# Patient Record
Sex: MALE | Race: WHITE | NOT HISPANIC OR LATINO | Employment: FULL TIME | URBAN - METROPOLITAN AREA
[De-identification: names, ages, dates, MRNs, and addresses within clinical notes are randomized per-mention and may not be internally consistent; named-entity substitution may affect disease eponyms.]

---

## 2019-09-05 ENCOUNTER — OFFICE VISIT (OUTPATIENT)
Dept: FAMILY MEDICINE | Facility: CLINIC | Age: 44
End: 2019-09-05
Payer: COMMERCIAL

## 2019-09-05 VITALS
RESPIRATION RATE: 16 BRPM | DIASTOLIC BLOOD PRESSURE: 74 MMHG | WEIGHT: 197.6 LBS | SYSTOLIC BLOOD PRESSURE: 132 MMHG | HEIGHT: 69 IN | OXYGEN SATURATION: 99 % | HEART RATE: 65 BPM | TEMPERATURE: 98.7 F | BODY MASS INDEX: 29.27 KG/M2

## 2019-09-05 DIAGNOSIS — J02.9 SORE THROAT: ICD-10-CM

## 2019-09-05 DIAGNOSIS — B34.9 VIRAL SYNDROME: Primary | ICD-10-CM

## 2019-09-05 LAB
DEPRECATED S PYO AG THROAT QL EIA: NORMAL
SPECIMEN SOURCE: NORMAL

## 2019-09-05 PROCEDURE — 87081 CULTURE SCREEN ONLY: CPT | Performed by: NURSE PRACTITIONER

## 2019-09-05 PROCEDURE — 99213 OFFICE O/P EST LOW 20 MIN: CPT | Performed by: NURSE PRACTITIONER

## 2019-09-05 PROCEDURE — 87880 STREP A ASSAY W/OPTIC: CPT | Performed by: NURSE PRACTITIONER

## 2019-09-05 ASSESSMENT — MIFFLIN-ST. JEOR: SCORE: 1778.75

## 2019-09-05 NOTE — PROGRESS NOTES
"Subjective     Haroon Sebastian is a 44 year old male who presents to clinic today for the following health issues:    HPI   ENT Symptoms             Symptoms: cc Present Absent Comment   Fever/Chills       Fatigue       Muscle Aches       Eye Irritation       Sneezing       Nasal Samuel/Drg       Sinus Pressure/Pain       Loss of smell       Dental pain       Sore Throat  x     Swollen Glands       Ear Pain/Fullness       Cough       Wheeze       Chest Pain       Shortness of breath       Rash       Other         Symptom duration:  2 days    Symptom severity:  same    Treatments tried:  Ibuprofen    Contacts:  none            Patient Active Problem List   Diagnosis     CARDIOVASCULAR SCREENING; LDL GOAL LESS THAN 160     Family history of diabetes mellitus     Past Surgical History:   Procedure Laterality Date     carpal tunnel right  2005       Social History     Tobacco Use     Smoking status: Former Smoker     Types: Cigarettes     Smokeless tobacco: Never Used   Substance Use Topics     Alcohol use: No     Family History   Problem Relation Age of Onset     Diabetes Father      Hypertension Father      Heart Disease Maternal Grandfather      Gastrointestinal Disease Brother          Current Outpatient Medications   Medication Sig Dispense Refill     amoxicillin-clavulanate (AUGMENTIN) 875-125 MG per tablet Take 1 tablet by mouth 2 times daily (Patient not taking: Reported on 9/5/2019) 20 tablet 0     No Known Allergies      Reviewed and updated as needed this visit by Provider  Tobacco  Allergies  Meds  Problems  Med Hx  Surg Hx  Fam Hx         Review of Systems   ROS COMP: Constitutional, HEENT, cardiovascular, pulmonary, GI, , musculoskeletal, neuro, skin, endocrine and psych systems are negative, except as otherwise noted.      Objective    /74 (BP Location: Right arm, Patient Position: Chair, Cuff Size: Adult Large)   Pulse 65   Temp 98.7  F (37.1  C) (Tympanic)   Resp 16   Ht 1.74 m (5' 8.5\") " "  Wt 89.6 kg (197 lb 9.6 oz)   SpO2 99%   BMI 29.61 kg/m    Body mass index is 29.61 kg/m .  Physical Exam   GENERAL: healthy, alert and no distress, nontoxic in appearance  EYES: Eyes grossly normal to inspection, PERRL and conjunctivae and sclerae normal  HENT: ear canals and TM's normal, nose and mouth without ulcers or lesions  NECK: no adenopathy, supple with full ROM  RESP: lungs clear to auscultation - no rales, rhonchi or wheezes  CV: regular rate and rhythm, normal S1 S2, no S3 or S4, no murmur, click or rub, no peripheral edema   ABDOMEN: soft, nontender, no hepatosplenomegaly, no masses and bowel sounds normal  MS: no gross musculoskeletal defects noted, no edema  No rash    Diagnostic Test Results:  Labs reviewed in Epic  Results for orders placed or performed in visit on 09/05/19 (from the past 24 hour(s))   Strep, Rapid Screen   Result Value Ref Range    Specimen Description Throat     Rapid Strep A Screen       NEGATIVE: No Group A streptococcal antigen detected by immunoassay, await culture report.           Assessment & Plan   Problem List Items Addressed This Visit     None      Visit Diagnoses     Viral syndrome    -  Primary    Sore throat        Relevant Orders    Strep, Rapid Screen (Completed)    Beta strep group A culture (Completed)             BMI:   Estimated body mass index is 29.61 kg/m  as calculated from the following:    Height as of this encounter: 1.74 m (5' 8.5\").    Weight as of this encounter: 89.6 kg (197 lb 9.6 oz).   Weight management plan: Patient was referred to their PCP to discuss a diet and exercise plan.        Patient Instructions   Increase rest and fluids. Tylenol and/or Ibuprofen for comfort. Cool mist vaporizer. If your symptoms worsen or do not resolve follow up with your primary care provider in 1 week and sooner if needed.      Strep culture is pending will result in 24-48 hours.  If it is positive and change in treatment plan will contact you.  " "    Indications for emergent return to emergency department discussed with patient, who verbalized good understanding and agreement.  Patient understands the limitations of today's evaluation.           Patient Education     Viral Syndrome (Adult)  A viral illness may cause a number of symptoms such as fever. Other symptoms depend on the part of the body that the virus affects. If it settles in your nose, throat, and lungs, it may cause cough, sore throat, congestion, runny nose, headache, earache and other ear symptoms, or shortness of breath. If it settles in your stomach and intestinal tract, it may cause nausea, vomiting, cramping, and diarrhea. Sometimes it causes generalized symptoms like \"aching all over,\" feeling tired, loss of energy, or loss of appetite.  A viral illness usually lasts anywhere from several days to several weeks, but sometimes it lasts longer. In some cases, a more serious infection can look like a viral syndrome in the first few days of the illness. You may need another exam and additional tests to know the difference. Watch for the warning signs listed below for when to seek medical advice.  Home care  Follow these guidelines for taking care of yourself at home:    If symptoms are severe, rest at home for the first 2 to 3 days.    Stay away from cigarette smoke - both your smoke and the smoke from others.    You may use over-the-counter acetaminophen or ibuprofen for fever, muscle aching, and headache, unless another medicine was prescribed for this. If you have chronic liver or kidney disease or ever had a stomach ulcer or gastrointestinal bleeding, talk with your healthcare provider before using these medicines. No one who is younger than 18 and ill with a fever should take aspirin. It may cause severe disease or death.    Your appetite may be poor, so a light diet is fine. Avoid dehydration by drinking 8 to 12, 8-ounce glasses of fluids each day. This may include water; orange juice; " lemonade; apple, grape, and cranberry juice; clear fruit drinks; electrolyte replacement and sports drinks; and decaffeinated teas and coffee. If you have been diagnosed with a kidney disease, ask your healthcare provider how much and what types of fluids you should drink to prevent dehydration. If you have kidney disease, drinking too much fluid can cause it build up in the your body and be dangerous to your health.    Over-the-counter remedies won't shorten the length of the illness but may be helpful for symptoms such as cough, sore throat, nasal and sinus congestion, or diarrhea. Don't use decongestants if you have high blood pressure.  Follow-up care  Follow up with your healthcare provider if you do not improve over the next week.  Call 911  Call 911 if any of the following occur:    Convulsion    Feeling weak, dizzy, or like you are going to faint    Chest pain, or more than mild shortness of breath  When to seek medical advice  Call your healthcare provider right away if any of these occur:    Cough with lots of colored sputum (mucus) or blood in your sputum    Chest pain, shortness of breath, wheezing, or trouble breathing    Severe headache; face, neck, or ear pain    Severe, constant pain in the lower right side of your belly (abdominal)    Continued vomiting (can t keep liquids down)    Frequent diarrhea (more than 5 times a day); blood (red or black color) or mucus in diarrhea    Feeling weak, dizzy, or like you are going to faint    Extreme thirst    Fever of 100.4 F (38 C) or higher, or as directed by your healthcare provider  Date Last Reviewed: 4/1/2018 2000-2018 [employer]. [address] [place] [address], [place] [address]. All rights reserved. This information is not intended as a substitute for professional medical care. Always follow your healthcare professional's instructions.             Return in about 1 week (around 9/12/2019), or if symptoms worsen or fail to improve, for Follow  up with your primary care provider.    CINTHYA Perez CNP  [facility]

## 2019-09-05 NOTE — LETTER
September 6, 2019      Haroon Sebastian  [address] [facility] [id number]        Dear Haroon,         This letter is to inform you that the results of your recent throat culture are negative.  If you have any questions please call or make an appointment.          Sincerely,        CINTHYA Perez CNP/ [facility]al

## 2019-09-05 NOTE — PATIENT INSTRUCTIONS
"Increase rest and fluids. Tylenol and/or Ibuprofen for comfort. Cool mist vaporizer. If your symptoms worsen or do not resolve follow up with your primary care provider in 1 week and sooner if needed.      Strep culture is pending will result in 24-48 hours.  If it is positive and change in treatment plan will contact you.      Indications for emergent return to emergency department discussed with patient, who verbalized good understanding and agreement.  Patient understands the limitations of today's evaluation.           Patient Education     Viral Syndrome (Adult)  A viral illness may cause a number of symptoms such as fever. Other symptoms depend on the part of the body that the virus affects. If it settles in your nose, throat, and lungs, it may cause cough, sore throat, congestion, runny nose, headache, earache and other ear symptoms, or shortness of breath. If it settles in your stomach and intestinal tract, it may cause nausea, vomiting, cramping, and diarrhea. Sometimes it causes generalized symptoms like \"aching all over,\" feeling tired, loss of energy, or loss of appetite.  A viral illness usually lasts anywhere from several days to several weeks, but sometimes it lasts longer. In some cases, a more serious infection can look like a viral syndrome in the first few days of the illness. You may need another exam and additional tests to know the difference. Watch for the warning signs listed below for when to seek medical advice.  Home care  Follow these guidelines for taking care of yourself at home:    If symptoms are severe, rest at home for the first 2 to 3 days.    Stay away from cigarette smoke - both your smoke and the smoke from others.    You may use over-the-counter acetaminophen or ibuprofen for fever, muscle aching, and headache, unless another medicine was prescribed for this. If you have chronic liver or kidney disease or ever had a stomach ulcer or gastrointestinal bleeding, talk with your " healthcare provider before using these medicines. No one who is younger than 18 and ill with a fever should take aspirin. It may cause severe disease or death.    Your appetite may be poor, so a light diet is fine. Avoid dehydration by drinking 8 to 12, 8-ounce glasses of fluids each day. This may include water; orange juice; lemonade; apple, grape, and cranberry juice; clear fruit drinks; electrolyte replacement and sports drinks; and decaffeinated teas and coffee. If you have been diagnosed with a kidney disease, ask your healthcare provider how much and what types of fluids you should drink to prevent dehydration. If you have kidney disease, drinking too much fluid can cause it build up in the your body and be dangerous to your health.    Over-the-counter remedies won't shorten the length of the illness but may be helpful for symptoms such as cough, sore throat, nasal and sinus congestion, or diarrhea. Don't use decongestants if you have high blood pressure.  Follow-up care  Follow up with your healthcare provider if you do not improve over the next week.  Call 911  Call 911 if any of the following occur:    Convulsion    Feeling weak, dizzy, or like you are going to faint    Chest pain, or more than mild shortness of breath  When to seek medical advice  Call your healthcare provider right away if any of these occur:    Cough with lots of colored sputum (mucus) or blood in your sputum    Chest pain, shortness of breath, wheezing, or trouble breathing    Severe headache; face, neck, or ear pain    Severe, constant pain in the lower right side of your belly (abdominal)    Continued vomiting (can t keep liquids down)    Frequent diarrhea (more than 5 times a day); blood (red or black color) or mucus in diarrhea    Feeling weak, dizzy, or like you are going to faint    Extreme thirst    Fever of 100.4 F (38 C) or higher, or as directed by your healthcare provider  Date Last Reviewed: 4/1/2018 2000-2018 [employer]. [address] [place] [address], [place] [address]. All rights reserved. This information is not intended as a substitute for professional medical care. Always follow your healthcare professional's instructions.

## 2019-09-05 NOTE — NURSING NOTE
"Chief Complaint   Patient presents with     Pharyngitis       Initial /74 (BP Location: Right arm, Patient Position: Chair, Cuff Size: Adult Large)   Pulse 65   Temp 98.7  F (37.1  C) (Tympanic)   Resp 16   Ht 1.74 m (5' 8.5\")   Wt 89.6 kg (197 lb 9.6 oz)   SpO2 99%   BMI 29.61 kg/m   Estimated body mass index is 29.61 kg/m  as calculated from the following:    Height as of this encounter: 1.74 m (5' 8.5\").    Weight as of this encounter: 89.6 kg (197 lb 9.6 oz).    Patient presents to the clinic using No DME    Health Maintenance that is potentially due pending provider review:  Flu shot    Pt will schedule future appt.    Is there anyone who you would like to be able to receive your results? No  If yes have patient fill out SANDHYA  Nasir Galvan M.A.        "

## 2019-09-06 LAB
BACTERIA SPEC CULT: NORMAL
SPECIMEN SOURCE: NORMAL

## 2022-05-01 ENCOUNTER — HEALTH MAINTENANCE LETTER (OUTPATIENT)
Age: 47
End: 2022-05-01

## 2022-06-06 ENCOUNTER — OFFICE VISIT (OUTPATIENT)
Dept: FAMILY MEDICINE | Facility: CLINIC | Age: 47
End: 2022-06-06
Payer: COMMERCIAL

## 2022-06-06 VITALS
RESPIRATION RATE: 16 BRPM | SYSTOLIC BLOOD PRESSURE: 120 MMHG | DIASTOLIC BLOOD PRESSURE: 82 MMHG | BODY MASS INDEX: 33.04 KG/M2 | HEIGHT: 68 IN | TEMPERATURE: 99.1 F | HEART RATE: 70 BPM | WEIGHT: 218 LBS

## 2022-06-06 DIAGNOSIS — Z13.1 SCREENING FOR DIABETES MELLITUS: ICD-10-CM

## 2022-06-06 DIAGNOSIS — M54.50 RIGHT-SIDED LOW BACK PAIN WITHOUT SCIATICA, UNSPECIFIED CHRONICITY: Primary | ICD-10-CM

## 2022-06-06 DIAGNOSIS — Z13.220 SCREENING FOR HYPERLIPIDEMIA: ICD-10-CM

## 2022-06-06 DIAGNOSIS — Z11.59 NEED FOR HEPATITIS C SCREENING TEST: ICD-10-CM

## 2022-06-06 LAB
CHOLEST SERPL-MCNC: 196 MG/DL
FASTING STATUS PATIENT QL REPORTED: NO
FASTING STATUS PATIENT QL REPORTED: NO
GLUCOSE BLD-MCNC: 92 MG/DL (ref 70–99)
HDLC SERPL-MCNC: 41 MG/DL
LDLC SERPL CALC-MCNC: 113 MG/DL
NONHDLC SERPL-MCNC: 155 MG/DL
TRIGL SERPL-MCNC: 209 MG/DL

## 2022-06-06 PROCEDURE — 82947 ASSAY GLUCOSE BLOOD QUANT: CPT | Performed by: FAMILY MEDICINE

## 2022-06-06 PROCEDURE — 86803 HEPATITIS C AB TEST: CPT | Performed by: FAMILY MEDICINE

## 2022-06-06 PROCEDURE — 80061 LIPID PANEL: CPT | Performed by: FAMILY MEDICINE

## 2022-06-06 PROCEDURE — 99213 OFFICE O/P EST LOW 20 MIN: CPT | Performed by: FAMILY MEDICINE

## 2022-06-06 PROCEDURE — 36415 COLL VENOUS BLD VENIPUNCTURE: CPT | Performed by: FAMILY MEDICINE

## 2022-06-06 ASSESSMENT — ANXIETY QUESTIONNAIRES
6. BECOMING EASILY ANNOYED OR IRRITABLE: SEVERAL DAYS
7. FEELING AFRAID AS IF SOMETHING AWFUL MIGHT HAPPEN: NOT AT ALL
7. FEELING AFRAID AS IF SOMETHING AWFUL MIGHT HAPPEN: NOT AT ALL
GAD7 TOTAL SCORE: 6
3. WORRYING TOO MUCH ABOUT DIFFERENT THINGS: SEVERAL DAYS
8. IF YOU CHECKED OFF ANY PROBLEMS, HOW DIFFICULT HAVE THESE MADE IT FOR YOU TO DO YOUR WORK, TAKE CARE OF THINGS AT HOME, OR GET ALONG WITH OTHER PEOPLE?: SOMEWHAT DIFFICULT
GAD7 TOTAL SCORE: 6
4. TROUBLE RELAXING: SEVERAL DAYS
5. BEING SO RESTLESS THAT IT IS HARD TO SIT STILL: SEVERAL DAYS
1. FEELING NERVOUS, ANXIOUS, OR ON EDGE: SEVERAL DAYS
2. NOT BEING ABLE TO STOP OR CONTROL WORRYING: SEVERAL DAYS
GAD7 TOTAL SCORE: 6

## 2022-06-06 ASSESSMENT — PATIENT HEALTH QUESTIONNAIRE - PHQ9
SUM OF ALL RESPONSES TO PHQ QUESTIONS 1-9: 5
10. IF YOU CHECKED OFF ANY PROBLEMS, HOW DIFFICULT HAVE THESE PROBLEMS MADE IT FOR YOU TO DO YOUR WORK, TAKE CARE OF THINGS AT HOME, OR GET ALONG WITH OTHER PEOPLE: NOT DIFFICULT AT ALL
SUM OF ALL RESPONSES TO PHQ QUESTIONS 1-9: 5

## 2022-06-06 ASSESSMENT — PAIN SCALES - GENERAL: PAINLEVEL: NO PAIN (1)

## 2022-06-06 NOTE — PATIENT INSTRUCTIONS
ASSESSMENT:   (M54.50) Right-sided low back pain without sciatica, unspecified chronicity  (primary encounter diagnosis)  Comment: recurring low back pain. This appears muscular-ligamentous-strain/pulled muscles.  Nerves OK.   Plan: XR Lumbar Spine 2/3 Views          OK for the ibuprofen as needed.   Ibuprofen 200mg OTC may be taken up to 4 pills 4 times a day to the maximum of 3200mg or 16 pills daily as needed for pain.   Take with food.  Don't take with aspirin, Aleve or other antiinflammatory medication or with warfarin.     You can take acetaminophen with the ibuprofen if needed.   Sometimes the two together work better than each one.   We sometimes try a relative of ibuprofen or a muscle relaxer.     Weight loss can help in the longer term     I suggest physical therapy for the back pain and strengthening to prevent more pain.     Acetaminophen (Tylenol) may be taken as needed for pain and fever.  The maximum doses are listed below, around 3000mg a day.  Regular strength pills are 325mg.  The maximum daily dose is two pills (650mg) every 4 to 6 hours up to 3250mg a day.   Extra strength pills are 500mg each.  The maximum dose is two pills (1000mg) every 6 hours as needed up to 3000mg a day.   Extended release pills are 650mg each.  The maximum dose is two pills (1300mg) every 8 hours as needed up to 3900mg a day.     Watch out for acetaminophen in other over the counter or prescription medications.      Too much acetaminophen can lead to serious liver damage.     Schedule an appointment with physical therapy for help with the back pain and prevention.     (Z11.59) Need for hepatitis C screening test    (Z13.220) Screening for hyperlipidemia

## 2022-06-06 NOTE — NURSING NOTE
"Chief Complaint   Patient presents with     Pain       Initial /82   Pulse 70   Temp 99.1  F (37.3  C) (Tympanic)   Resp 16   Ht 1.734 m (5' 8.25\")   Wt 98.9 kg (218 lb)   BMI 32.90 kg/m   Estimated body mass index is 32.9 kg/m  as calculated from the following:    Height as of this encounter: 1.734 m (5' 8.25\").    Weight as of this encounter: 98.9 kg (218 lb).    Patient presents to the clinic using     Health Maintenance that is potentially due pending provider review:          Is there anyone who you would like to be able to receive your results?   If yes have patient fill out SANDHYA    "

## 2022-06-06 NOTE — PROGRESS NOTES
ASSESSMENT:   (M54.50) Right-sided low back pain without sciatica, unspecified chronicity  (primary encounter diagnosis)  Comment: recurring low back pain. This appears muscular-ligamentous-strain/pulled muscles.  Nerves OK.   Plan: XR Lumbar Spine 2/3 Views          OK for the ibuprofen as needed.   Ibuprofen 200mg OTC may be taken up to 4 pills 4 times a day to the maximum of 3200mg or 16 pills daily as needed for pain.   Take with food.  Don't take with aspirin, Aleve or other antiinflammatory medication or with warfarin.     You can take acetaminophen with the ibuprofen if needed.   Sometimes the two together work better than each one.   We sometimes try a relative of ibuprofen or a muscle relaxer.     Weight loss can help in the longer term     I suggest physical therapy for the back pain and strengthening to prevent more pain.     Acetaminophen (Tylenol) may be taken as needed for pain and fever.  The maximum doses are listed below, around 3000mg a day.  Regular strength pills are 325mg.  The maximum daily dose is two pills (650mg) every 4 to 6 hours up to 3250mg a day.   Extra strength pills are 500mg each.  The maximum dose is two pills (1000mg) every 6 hours as needed up to 3000mg a day.   Extended release pills are 650mg each.  The maximum dose is two pills (1300mg) every 8 hours as needed up to 3900mg a day.     Watch out for acetaminophen in other over the counter or prescription medications.      Too much acetaminophen can lead to serious liver damage.     Schedule an appointment with physical therapy for help with the back pain and prevention.     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    Throws or pulls back out.  Frequency: every 6-8 weeks more recently.   Was occurring a couple times a year until the past 1-2 years.  Can feel it coming on.   He has seen doctor at work and has been seen three times for this.   Duration: 3 days then fades over the next 3-4 days.   Alleviating factors: ibuprofen 600-800mg up to three times daily.   No other meds.   He tried cyclobenzaprine in the past which did not help.      Location: lower right back.   Can radiate to right hip when bad.  Never goes to buttock or leg.   No neuro symptoms in legs.     Occupation: drywall taping.     Pain History:  When did you first notice your pain? - Chronic Pain   Have you seen this provider for your pain in the past?   No   Where in your body do your have pain? Low back  Are you seeing anyone else for your pain? No  What makes your pain better?   What makes your pain worse? Squatting   How has pain affected your ability to work? Slows him down, can still work   Worse pain: 8/10      PHQ-9 SCORE 6/6/2022   PHQ-9 Total Score MyChart 5 (Mild depression)   PHQ-9 Total Score 5       JEREMY-7 SCORE 6/6/2022   Total Score 6 (mild anxiety)   Total Score 6       Chronic Pain - Initial Assessment:    How would you describe your pain? Cramping dull ache sharp  Have you had any recent changes to the severity or character of your pain? Gradually worsening   Is there an underlying cause that has been identified? ??? Injury back when he was 20.  Has your ability to work or do daily activities changed recently because of your pain?slows him down  Which of these pain treatments have you tried? Ibuprofen 600-800 mg up to tid  Previous medication treatments:        Patient Active Problem List   Diagnosis     CARDIOVASCULAR SCREENING; LDL GOAL LESS THAN 160     Family history of diabetes mellitus      ROS:General: POSITIVE for:, weight gain 20# in the past couple years.   Wt Readings from Last 5 Encounters:   06/06/22 98.9 kg (218 lb)   09/05/19 89.6 kg (197 lb 9.6 oz)   10/01/13 89.8 kg (198 lb)   10/01/13 89.8 kg (198  "lb)   07/13/11 92.9 kg (204 lb 12.8 oz)      GI: No nausea, vomiting,  heartburn, abdominal pain, diarrhea, constipation or change in bowel habits  : No urinary frequency or dysuria, bladder or kidney problems  Musculoskeletal: POSITIVE  for:, pain left hip and left knee chronic pain.  Shoulder injury in the past.     OBJECTIVE:Blood pressure 120/82, pulse 70, temperature 99.1  F (37.3  C), temperature source Tympanic, resp. rate 16, height 1.734 m (5' 8.25\"), weight 98.9 kg (218 lb). BMI=Body mass index is 32.9 kg/m .  GENERAL APPEARANCE ADULT: Alert, no acute distress, obese  MS: extremities normal, no peripheral edema  back exam: normal posture, shoulders, inferior scapular borders and hips even and symmetrical, moves about the exam room comfortably, ROM good flexion, some pain with extension.  Some pain with lateral flexion to left side.  No pain with rotation to either side.  tenderness to palpitation Right SI area, straight leg raise right normal, straight leg raise left normal, reflexes at knees normal, reflexes at ankles normal, heel and toe walk normal       "

## 2022-06-08 LAB — HCV AB SERPL QL IA: NONREACTIVE

## 2022-06-08 NOTE — RESULT ENCOUNTER NOTE
"Hi Haroon,   Triglycerides, a type of fat found in the bloodstream is high.  The HDL \"good cholesterol\" is at a normal level.  LDL (\"bad cholesterol\") is high.  This increases risk for cardiovascular disease (heart attacks and strokes).   PLAN: Your overall cardiovascular risk is low at this time.  I don't think you need medication for cholesterol but Weight loss, regular exercise with goal of 30 minutes most days of the week and eating a prudent diet (lots of fruits and vegetables, limiting unhealthy fats and excessive calories) can help improve triglycerides and LDL cholesterol.  DIANA HOUGH MD    "

## 2022-06-30 ENCOUNTER — HOSPITAL ENCOUNTER (OUTPATIENT)
Dept: PHYSICAL THERAPY | Facility: CLINIC | Age: 47
Setting detail: THERAPIES SERIES
Discharge: HOME | End: 2022-06-30
Attending: FAMILY MEDICINE
Payer: COMMERCIAL

## 2022-06-30 DIAGNOSIS — M54.50 RIGHT-SIDED LOW BACK PAIN WITHOUT SCIATICA, UNSPECIFIED CHRONICITY: ICD-10-CM

## 2022-06-30 PROCEDURE — 97161 PT EVAL LOW COMPLEX 20 MIN: CPT | Mod: GP | Performed by: PHYSICAL THERAPIST

## 2022-06-30 PROCEDURE — 97110 THERAPEUTIC EXERCISES: CPT | Mod: GP | Performed by: PHYSICAL THERAPIST

## 2022-06-30 NOTE — PROGRESS NOTES
"   06/30/22 1400   General Information   Type of Visit Initial OP Ortho PT Evaluation   Start of Care Date 06/30/22   Referring Physician Ford Martinez   Patient/Family Goals Statement decreased pain, keep snow boarding,   Orders Evaluate and Treat   Date of Order 06/06/22   Medical Diagnosis Right-sided low back pain without sciatica, unspecified chronicity   Surgical/Medical history reviewed Yes   Precautions/Limitations no known precautions/limitations   General Information Comments PMH: depression   Body Part(s)   Body Part(s) Lumbar Spine/SI   Presentation and Etiology   Pertinent history of current problem (include personal factors and/or comorbidities that impact the POC) 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    Throws or pulls back out.  Frequency: every 6-8 weeks more recently.  Was occurring a couple times a year until the past 1-2 years.  Can feel it coming on.   He has seen doctor at work and has been seen three times for this.   Back pain is there most days, more on the right than left.  Notices that it increases with certain positions such as looking down and to the right. Gradually has gone \"out\" about every other month. No symptoms down the leg when it does go out. No buckling, no numbness or tingling. IB profen helps when the back goes out. Rest seems to help the most. Has tried TENS unit as well and that seems to help.   Impairments A. Pain;E. Decreased flexibility   Functional Limitations perform activities of daily living;perform required work activities;perform desired leisure / sports activities   Symptom Location low back on both sides   How/Where did it occur With repetition/overuse;From insidious onset   Onset date of current episode/exacerbation 06/06/22  (chronic)   Chronicity Recurrent   Pain rating (0-10 point scale) Best (/10);Worst (/10)   Best (/10) 2   Worst (/10) 9   Pain quality A. Sharp;B. Dull;C. Aching;F. Stabbing   Frequency of pain/symptoms C. With " activity   Pain/symptoms exacerbated by G. Certain positions;I. Bending   Pain/symptoms eased by C. Rest;I. OTC medication(s)   Progression of symptoms since onset: Worsened   Current / Previous Interventions   Diagnostic Tests: X-ray   X-ray Results Results   X-ray results There are 5 lumbar type vertebral bodies. Small riblets are seen at the L1 level. There is minimal broad-based dextroconvex curvature of the lumbar spine. The alignment of the lumbar spine is otherwise normal. The vertebral body heights are   maintained. There is minimal multilevel intervertebral disc space narrowing and endplate degenerative change, most pronounced at L5-S1. The visualized abdominal soft tissues are unremarkable.   Current Level of Function   Patient role/employment history A. Employed   Employment Comments dry    Fall Risk Screen   Fall screen completed by PT   Have you fallen 2 or more times in the past year? No   Have you fallen and had an injury in the past year? No   Is patient a fall risk? No   Abuse Screen (yes response referral indicated)   Feels Unsafe at Home or Work/School no   Feels Threatened by Someone no   Does Anyone Try to Keep You From Having Contact with Others or Doing Things Outside Your Home? no   Physical Signs of Abuse Present no   System Outcome Measures   Outcome Measures Low Back Pain (see Oswestry and Dary)   Lumbar Spine/SI Objective Findings   Hamstring Flexibility 90/90:  45 from 0 B   Hip Flexor Flexibility mod tightness B   Piriformis Flexibility mod tightness B   Flexion ROM finger tips 8 inches from toes (no increase in pain)   Extension ROM WNL, mild tight feeling at end range   Right Side Bending ROM finger tips 1 inch above knee joint line (Compression feeling)   Left Side Bending ROM finger tip 2 inches from joint line (some increase in pain)   Repeated Extension-Standing ROM no change in pain   Repeated Flexion-Standing ROM no change in pain   Lumbar ROM Comment rotation WNL no  pain at end range   Pelvic Screen SI compression, approximation and distraction - B   Hip Screen hip flexion to 100 R and 110 L   Hip Flexion (L2) Strength 5/5   Hip Abduction Strength 4+/5 B   Knee Flexion Strength 5/5 b   Knee Extension (L3) Strength 5/5 B   Ford Test + for back pain and hip tightness on R, - on L for back pain but + for tightness   Prone Instability Test -   Spring Test L2-3   Lumbar/SI Special Tests Comments passive lumbar extension - for pain   Segmental Mobility L2-3 hypermobile with some pain   Palpation increased tone and mild tenderness through right lumbar paraspinals, right QL and right glut med   Observation left ASIS higher   Sensation Testing WNL B   Planned Therapy Interventions   Planned Therapy Interventions balance training;gait training;joint mobilization;manual therapy;neuromuscular re-education;ROM;strengthening;stretching;transfer training   Clinical Impression   Criteria for Skilled Therapeutic Interventions Met yes, treatment indicated   PT Diagnosis decreased lumbar core strength and muscle tightness   Influenced by the following impairments decreased lumbar core strength and muscle tightness, joint hypermobility   Functional limitations due to impairments bending, reaching, random movements   Clinical Presentation Stable/Uncomplicated   Clinical Presentation Rationale clinical decision making and chart review   Clinical Decision Making (Complexity) Low complexity   Therapy Frequency 1 time/week   Predicted Duration of Therapy Intervention (days/wks) 8 weeks   Risk & Benefits of therapy have been explained Yes   Patient, Family & other staff in agreement with plan of care Yes   Clinical Impression Comments Haroon Sebastian is a 47 year old male who presents to PT with complaints of recurrent back pain flair ups. Has general back pain at baseline most days but flair ups with random movements about once per month.   Education Assessment   Preferred Learning Style  Listening;Demonstration;Pictures/video   Barriers to Learning No barriers   ORTHO GOALS   PT Ortho Eval Goals 1;2;3;4   Ortho Goal 1   Goal Identifier 1   Goal Description Patient will improve core strength to be able to complete a plank for 30 seconds from knees in order to improve LB stabilization and pain levels.   Target Date 07/28/22   Ortho Goal 2   Goal Identifier 2   Goal Description Patient will improve hip flexor flexibility so parvin test doesn't produce pain on right side of low back in order to improve pelvic position and decrease back pain.   Target Date 07/28/22   Ortho Goal 3   Goal Identifier 3   Goal Description Patient will be able to complete all work related tasks with back pain staying below a 2/10 pain during the day.   Target Date 08/25/22   Ortho Goal 4   Goal Identifier 4   Goal Description Patient will improve B HS flexiblity to be able to reach within 3 inches of his toes in order to decrease tightness and pulling on low back during work and recreational activities.   Target Date 08/25/22   Total Evaluation Time   PT Eval, Low Complexity Minutes (34795) 17       Vanessa Brown  PT, DPT       6/30/2022   [address]   [address] [place] [id number]  [email or web address]  Voicemail: [phone]

## 2022-08-25 NOTE — PROGRESS NOTES
Physical Therapy Discharge Summary    Haroon Sebastian has completed the ordered physical therapy evaluation. Treatment recommendations were made, but pt has not returned for any further recommended PT sessions.  Pt was unable to be re-assessed, and present status is unknown.    Please contact me with any questions or concerns.  Thank you for your referral.    Vanessa Brown  PT, DPT       8/25/2022   [address] [address]  [email or web address]  Voicemail: [phone]

## 2022-11-20 ENCOUNTER — HEALTH MAINTENANCE LETTER (OUTPATIENT)
Age: 47
End: 2022-11-20

## 2023-06-02 ENCOUNTER — HEALTH MAINTENANCE LETTER (OUTPATIENT)
Age: 48
End: 2023-06-02

## 2023-09-14 ENCOUNTER — OFFICE VISIT (OUTPATIENT)
Dept: FAMILY MEDICINE | Facility: CLINIC | Age: 48
End: 2023-09-14
Payer: COMMERCIAL

## 2023-09-14 VITALS
HEART RATE: 70 BPM | WEIGHT: 199 LBS | RESPIRATION RATE: 16 BRPM | TEMPERATURE: 98.7 F | OXYGEN SATURATION: 98 % | SYSTOLIC BLOOD PRESSURE: 120 MMHG | DIASTOLIC BLOOD PRESSURE: 82 MMHG | BODY MASS INDEX: 30.04 KG/M2

## 2023-09-14 DIAGNOSIS — Z11.3 SCREEN FOR STD (SEXUALLY TRANSMITTED DISEASE): ICD-10-CM

## 2023-09-14 DIAGNOSIS — R39.89 GENITAL SORE: Primary | ICD-10-CM

## 2023-09-14 DIAGNOSIS — B00.9 HERPES SIMPLEX VIRUS INFECTION: ICD-10-CM

## 2023-09-14 PROCEDURE — 99213 OFFICE O/P EST LOW 20 MIN: CPT | Performed by: FAMILY MEDICINE

## 2023-09-14 PROCEDURE — 87491 CHLMYD TRACH DNA AMP PROBE: CPT | Performed by: FAMILY MEDICINE

## 2023-09-14 PROCEDURE — 87529 HSV DNA AMP PROBE: CPT | Performed by: FAMILY MEDICINE

## 2023-09-14 PROCEDURE — 87591 N.GONORRHOEAE DNA AMP PROB: CPT | Performed by: FAMILY MEDICINE

## 2023-09-14 NOTE — PROGRESS NOTES
Assessment & Plan     Genital sore  - valACYclovir (VALTREX) 1000 mg tablet; Take 1 tablet (1,000 mg) by mouth 2 times daily for 10 days  Screen for STD (sexually transmitted disease)  - Chlamydia & Gonorrhea by PCR, [facility] - Clinic Collect  - Viral Culture Non-respiratory; Future  - HSV Types 1 and 2 Qualitative PCR CSF; Future  - HSV Types 1 and 2 Qualitative PCR CSF  Herpes simplex virus infection  - valACYclovir (VALTREX) 1000 mg tablet; Take 1 tablet (1,000 mg) by mouth 2 times daily for 10 days  EHR reviewed.   Past medical history, problem list, past surgical history, family history, social history, medications reviewed, updated, reconciled.   We discussed options of STD screening. He agrees to screen for chlamydia, gonorrhea. He declines blood work for HIV, RPR, hepatitis, encouraged to consider these in the future.   Lesion swabbed for suspected herpes virus. Positive for HSV 2. I spoke to Haroon. Will complete course of antiviral medication. We discussed natural course, risk of recurrence, notification of  partner. All questions and concerns addressed.   Encouraged to keep up with routine health maintenance.       Aman Brush MD  [facility]    Ramandeep Patiño is a 48 year old, presenting for the following health issues:  Other (Blisters and sores in genital area, mild pain, noticed about a week ago )      9/14/2023    12:38 PM   Additional Questions   Roomed by Grecia       History of Present Illness       Reason for visit:  Possible std  Symptom onset:  3-7 days ago  Symptoms include:  Blisters  Symptom intensity:  Mild  Symptom progression:  Staying the same  Had these symptoms before:  No    He eats 0-1 servings of fruits and vegetables daily.He consumes 2 sweetened beverage(s) daily.He exercises with enough effort to increase his heart rate 9 or less minutes per day.  He exercises with enough effort to increase his heart rate 3 or less days per week.   He is taking  medications regularly.     [age] year old male here with concerns of painful blisters, located on the top of the penis. He notes they are not that painful anymore, maybe a new lesion popped up today though. The others seemed to be drying up. Is worried and wants to be checked for sexually transmitted disease. He notes he was with his wife for twenty years until recent divorce. He has a new partner, about five months ago, he is not aware of any current exposure to infection. He has no dysuria. No other skin lesions though his skin is often rough, has small spots in his legs, wondering if this the same thing.             Objective    /82 (BP Location: Left arm, Patient Position: Sitting, Cuff Size: Adult Regular)   Pulse 70   Temp 98.7  F (37.1  C) (Oral)   Resp 16   Wt 90.3 kg (199 lb)   SpO2 98%   BMI 30.04 kg/m    Body mass index is 30.04 kg/m .  Physical Exam   GENERAL: healthy, alert and no distress  EYES: Eyes grossly normal to inspection, PERRL and conjunctivae and sclerae normal   (male): testicles normal without atrophy or masses and small and pinpoint lesions over superior and lateral shaft of penis, some dry and scabbing, one lesion on the right is open with small amount of s/s fluid.   MS: no gross musculoskeletal defects noted, no edema  SKIN: mild folliculitis over upper thighs  PSYCH: mentation appears normal, affect normal/bright    Results for orders placed or performed in visit on 09/14/23   Chlamydia & Gonorrhea by PCR, [facility] - Clinic Collect     Status: Normal    Specimen: Urine, Voided   Result Value Ref [facility]    Chlamydia Trachomatis Negative Negative    Neisseria gonorrhoeae Negative Negative   HSV Types 1 and 2 Qualitative PCR CSF     Status: Abnormal    Specimen: Penis; Swab   Result Value Ref [facility]    Herpes Simplex Virus 1 DNA Not Detected Not Detected    Herpes Simplex Virus 2 DNA Detected (A) Not Detected    Narrative    The [employer] Molecular Simplexa HSV 1 & 2  Direct assay on the Lia[employer] on Screen MDX instrument is a FDA-approved, real-time PCR test for the qualitative detection and differentiation of Herpes Simplex virus Type 1 & 2 DNA from patients with signs and symptoms of HSV-1 or 2 infection.           Prior to immunization administration, verified patients identity using patient s name and date of birth. Please see Immunization Activity for additional information.     Screening Questionnaire for Adult Immunization    Are you sick today?   No   Do you have allergies to medications, food, a vaccine component or latex?   No   Have you ever had a serious reaction after receiving a vaccination?   No   Do you have a long-term health problem with heart, lung, kidney, or metabolic disease (e.g., diabetes), asthma, a blood disorder, no spleen, complement component deficiency, a cochlear implant, or a spinal fluid leak?  Are you on long-term aspirin therapy?   No   Do you have cancer, leukemia, HIV/AIDS, or any other immune system problem?   No   Do you have a parent, brother, or sister with an immune system problem?   No   In the past 3 months, have you taken medications that affect  your immune system, such as prednisone, other steroids, or anticancer drugs; drugs for the treatment of rheumatoid arthritis, Crohn s disease, or psoriasis; or have you had radiation treatments?   No   Have you had a seizure, or a brain or other nervous system problem?   No   During the past year, have you received a transfusion of blood or blood    products, or been given immune (gamma) globulin or antiviral drug?   No   For women: Are you pregnant or is there a chance you could become       pregnant during the next month?   No   Have you received any vaccinations in the past 4 weeks?   No     Immunization questionnaire answers were all negative.      Patient instructed to remain in clinic for 15 minutes afterwards, and to report any adverse reactions.     Screening performed by Grecia Aggarwal CMA on  9/14/2023 at 12:41 PM.

## 2023-09-15 LAB
C TRACH DNA SPEC QL PROBE+SIG AMP: NEGATIVE
HSV1 DNA SPEC QL NAA+PROBE: NOT DETECTED
HSV2 DNA SPEC QL NAA+PROBE: DETECTED
N GONORRHOEA DNA SPEC QL NAA+PROBE: NEGATIVE

## 2023-09-15 RX ORDER — VALACYCLOVIR HYDROCHLORIDE 1 G/1
1000 TABLET, FILM COATED ORAL 2 TIMES DAILY
Qty: 20 TABLET | Refills: 0 | Status: SHIPPED | OUTPATIENT
Start: 2023-09-15 | End: 2023-09-25

## 2024-06-22 ENCOUNTER — HEALTH MAINTENANCE LETTER (OUTPATIENT)
Age: 49
End: 2024-06-22

## 2025-07-12 ENCOUNTER — HEALTH MAINTENANCE LETTER (OUTPATIENT)
Age: 50
End: 2025-07-12